# Patient Record
Sex: FEMALE | Race: WHITE
[De-identification: names, ages, dates, MRNs, and addresses within clinical notes are randomized per-mention and may not be internally consistent; named-entity substitution may affect disease eponyms.]

---

## 2017-02-09 ENCOUNTER — HOSPITAL ENCOUNTER (OUTPATIENT)
Dept: HOSPITAL 80 - FIMAGING | Age: 62
End: 2017-02-09
Attending: NURSE PRACTITIONER
Payer: MEDICAID

## 2017-02-09 DIAGNOSIS — N64.59: Primary | ICD-10-CM

## 2017-02-09 DIAGNOSIS — R92.8: ICD-10-CM

## 2017-02-09 PROCEDURE — G0279 TOMOSYNTHESIS, MAMMO: HCPCS

## 2017-02-09 PROCEDURE — G0204 DX MAMMO INCL CAD BI: HCPCS

## 2017-02-09 NOTE — MA
Bilateral Diagnostic Digital Mammogram With Tomosynthesis



Clinical Indications: History of recent left breast retroareolar redness and swelling, now resolved



Technique:  Standard digital cephalocaudal and tomosynthesis mediolateral oblique projections were ob
tained. A true lateral digital view is performed of the left breast. The digital images were processe
d by the BokeeD computer aided detection system.



Comparison: None available.



Breast density: D; The breast tissue is extremely dense. This may lower the sensitivity of mammograph
y.



Findings: CAD was reviewed. There is extremely dense asymmetric breast parenchyma behind the left nip
ple which could obscure an underlying abnormality.  No suspicious findings are identified.



Impression: Negative mammogram. BI-RADS 1.



Recommendation:  We will proceed to sonography for further evaluation. This will occur shortly.



BI-RADS 0. Additional imaging left breast



[facility] will send a result letter to the patient.



Negative mammography should not preclude additional workup of a clinically suspicious finding.



The patient's information is entered into a reminder system with a target due date for her next mammo
gram.

## 2017-02-09 NOTE — US
Left Breast Ultrasound



History: Recent redness and pain behind lateral left areola, resolved



Comparison: Diagnostic mammogram earlier



Technique: I first performed a directed physical examination.   This was followed by ultrasound exam 
with a high frequency linear transducer.



Findings: On physical examination, with the patient in the right lateral decubitus position, there is
 an easily palpable small firm nodule in or directly beneath the lateral left areola. Directed sonogr
aphy of this nodule reveals a 4 x 2.5 mm hypoechoic solid nodule, associated with a focal overlying l
ocal skin bulge that likely represents a sebaceous cyst. There is no increased Doppler blood flow. Th
is is occult, even in retrospect, on the previous mammogram.



Impression: Suspect small areolar sebaceous cyst. Recommend 6 month follow-up ultrasound to ensure st
ability. Results and recommendation were discussed with the patient, who is in agreement with the isabella
n. I demonstrated to the patient how she could feel this nodule on her own self exam. I instructed he
r to return for follow-up ultrasound if the nodule increases in size.



There is 3. Probably benign.

## 2017-02-27 LAB
ANION GAP SERPL CALC-SCNC: 10 MEQ/L (ref 8–16)
CALCIUM SERPL-MCNC: 10.3 MG/DL (ref 8.5–10.4)
CHLORIDE SERPL-SCNC: 105 MEQ/L (ref 97–110)
CO2 SERPL-SCNC: 25 MEQ/L (ref 22–31)
CREAT SERPL-MCNC: 0.8 MG/DL (ref 0.6–1)
GFR SERPL CREATININE-BSD FRML MDRD: > 60 ML/MIN/{1.73_M2}
GLUCOSE SERPL-MCNC: 82 MG/DL (ref 70–100)
POTASSIUM SERPL-SCNC: 4.7 MEQ/L (ref 3.5–5.2)
SODIUM SERPL-SCNC: 140 MEQ/L (ref 134–144)

## 2017-03-14 ENCOUNTER — HOSPITAL ENCOUNTER (OUTPATIENT)
Dept: HOSPITAL 80 - FSGY | Age: 62
Discharge: HOME | End: 2017-03-14
Attending: INTERNAL MEDICINE
Payer: MEDICAID

## 2017-03-14 DIAGNOSIS — K92.1: Primary | ICD-10-CM

## 2017-03-14 PROCEDURE — 0DJD8ZZ INSPECTION OF LOWER INTESTINAL TRACT, VIA NATURAL OR ARTIFICIAL OPENING ENDOSCOPIC: ICD-10-PCS | Performed by: INTERNAL MEDICINE

## 2017-03-14 NOTE — GPN
[f 
rep st]



                                                                 PROCEDURE NOTE





PREPROCEDURE DIAGNOSIS:  Hematochezia, blood in her stool.



POSTPROCEDURE DIAGNOSIS:  Normal colonoscopy.  Poor colon preparation.



MEDICATIONS:  Monitored anesthesia care.



INDICATIONS:  The patient is a 61-year-old female with intermittent 
hematochezia.  She has never had a screening colonoscopy.  She is here for a 
diagnostic colonoscopy.  The risks and the benefits of the procedure were 
discussed with the patient and consent obtained.  Risks include, but not 
limited to, bleeding, perforation, and sedation.  The patient is ASA class 2.



DESCRIPTION OF PROCEDURE:  The pediatric colonoscope was advanced into the cecum
, which appeared normal.  There was stool found throughout the colon.  
Approximately 30% of the colon wall could not be seen.  No large polyps or 
colon cancer were seen.  Polyps less than 1 cm could have been missed.  The 
patient has a floppy colon with looping. Retroflexed views in the rectum were 
normal. 



IMPRESSION:  

1.  Poor colonoscopy preparation, not adequate for screening purposes.

2.  No findings to explain blood in the stool.



RECOMMENDATIONS:  

1.  Discharge to home with escort.

2.  Advance diet as tolerated.

3.  Continue current medications.

4.  Repeat colonoscopy at next available appointment with GoLYTELY preparation 
with the addition of 1 dose of magnesium citrate.  Recommend using the adult 
colonoscope for next colonoscopy given the floppy nature of her colon. 



I thank you for allowing me to participate in the care of your patient.  Please 
do not hesitate to call with questions.





Job #:  [id number]

MTDD

## 2018-02-27 ENCOUNTER — HOSPITAL ENCOUNTER (OUTPATIENT)
Dept: HOSPITAL 80 - FIMAGING | Age: 63
End: 2018-02-27
Attending: FAMILY MEDICINE
Payer: MEDICAID

## 2018-02-27 DIAGNOSIS — R91.1: Primary | ICD-10-CM

## 2018-04-19 ENCOUNTER — HOSPITAL ENCOUNTER (OUTPATIENT)
Dept: HOSPITAL 80 - FIMAGING | Age: 63
End: 2018-04-19
Attending: INTERNAL MEDICINE
Payer: MEDICAID

## 2018-04-19 DIAGNOSIS — R93.8: ICD-10-CM

## 2018-04-19 DIAGNOSIS — R91.1: Primary | ICD-10-CM

## 2018-04-19 PROCEDURE — 07B53ZX EXCISION OF RIGHT AXILLARY LYMPHATIC, PERCUTANEOUS APPROACH, DIAGNOSTIC: ICD-10-PCS | Performed by: RADIOLOGY

## 2018-04-19 PROCEDURE — BP48ZZZ ULTRASONOGRAPHY OF RIGHT SHOULDER: ICD-10-PCS | Performed by: RADIOLOGY

## 2018-05-08 NOTE — GHP
[f rep st]



                                                            HISTORY AND PHYSICAL





DATE OF ADMISSION:  05/09/2018



HISTORY OF PRESENT ILLNESS:  The patient is a 62-year-old woman with a history of chronic tobacco use
, who recently had a PET-CT to evaluate an increasing right upper lobe lung nodule.  It showed increa
se in the nodule from 9 mm in 2006 to 16 mm.  It has low FDG uptake.  There is also a right axillary 
lymph node that was more PET avid.  Biopsy was obtained that did not show adenocarcinoma. 



She quit smoking a couple months ago.



PAST MEDICAL HISTORY:  COPD, depression, hyperlipidemia.



PAST SURGICAL HISTORY:  Appendectomy, cervical spine fusion, eye surgery.



MEDICATIONS:  Meloxicam.



ALLERGIES:  No known drug allergies.



FAMILY HISTORY:  Prostate cancer.



SOCIAL HISTORY:  She recently quit tobacco use.



REVIEW OF SYSTEMS:  10-point review of systems negative, except per HPI.



PHYSICAL EXAMINATION:  GENERAL:  Pleasant, well-nourished, well-groomed woman.  HEENT:  Normocephalic
.  No gross hearing deficits.  Mucous membranes moist.  Pupils equal and round.  No scleral icterus. 
 LUNGS:  Clear to auscultation bilaterally.  No increased work of breathing.  CARDIAC:  Regular rate.
  No peripheral edema.  SKIN:  Warm and dry.  MUSCULOSKELETAL:  Normal gait.  Normal nails.  PSYCH:  
Mood appropriately tearful.



IMPRESSION:  A 62-year-old woman with a lung nodule increasing in size and strong tobacco history.  I
t is also PET avid.  This likely represents cancer.



PLAN:  I will take her to the operating room for mediastinoscopy.  If that is negative, then I will p
roceed with VATS right upper lobectomy. 



The risks and benefits including, but not limited to, stroke, heart attack, death, bleeding, infectio
n, prolonged air leak, and oxygen dependence, were discussed.





Job #:  [id number]

## 2018-05-09 ENCOUNTER — HOSPITAL ENCOUNTER (INPATIENT)
Dept: HOSPITAL 80 - F3E | Age: 63
LOS: 3 days | Discharge: HOME | DRG: 121 | End: 2018-05-12
Attending: SURGERY | Admitting: SURGERY
Payer: MEDICAID

## 2018-05-09 DIAGNOSIS — F17.210: ICD-10-CM

## 2018-05-09 DIAGNOSIS — Z91.81: ICD-10-CM

## 2018-05-09 DIAGNOSIS — Z98.1: ICD-10-CM

## 2018-05-09 DIAGNOSIS — J44.9: ICD-10-CM

## 2018-05-09 DIAGNOSIS — Z96.641: ICD-10-CM

## 2018-05-09 DIAGNOSIS — G43.909: ICD-10-CM

## 2018-05-09 DIAGNOSIS — Z86.73: ICD-10-CM

## 2018-05-09 DIAGNOSIS — E78.5: ICD-10-CM

## 2018-05-09 DIAGNOSIS — F32.9: ICD-10-CM

## 2018-05-09 DIAGNOSIS — D14.31: Primary | ICD-10-CM

## 2018-05-09 DIAGNOSIS — D72.9: ICD-10-CM

## 2018-05-09 PROCEDURE — 0BJQ4ZZ INSPECTION OF PLEURA, PERCUTANEOUS ENDOSCOPIC APPROACH: ICD-10-PCS | Performed by: SURGERY

## 2018-05-09 PROCEDURE — 07B74ZX EXCISION OF THORAX LYMPHATIC, PERCUTANEOUS ENDOSCOPIC APPROACH, DIAGNOSTIC: ICD-10-PCS | Performed by: SURGERY

## 2018-05-09 PROCEDURE — 0BBC4ZX EXCISION OF RIGHT UPPER LUNG LOBE, PERCUTANEOUS ENDOSCOPIC APPROACH, DIAGNOSTIC: ICD-10-PCS | Performed by: SURGERY

## 2018-05-09 RX ADMIN — ACETAMINOPHEN SCH MG: 500 TABLET ORAL at 21:21

## 2018-05-09 RX ADMIN — DOCUSATE SODIUM AND SENNOSIDES SCH: 50; 8.6 TABLET ORAL at 18:01

## 2018-05-09 RX ADMIN — OXYCODONE HYDROCHLORIDE PRN MG: 15 TABLET ORAL at 21:22

## 2018-05-09 RX ADMIN — KETOROLAC TROMETHAMINE SCH MG: 15 INJECTION, SOLUTION INTRAMUSCULAR; INTRAVENOUS at 17:56

## 2018-05-09 RX ADMIN — HYDROMORPHONE HYDROCHLORIDE PRN MG: 2 INJECTION INTRAMUSCULAR; INTRAVENOUS; SUBCUTANEOUS at 16:28

## 2018-05-09 RX ADMIN — Medication PRN MCG: at 16:27

## 2018-05-09 RX ADMIN — DOCUSATE SODIUM AND SENNOSIDES SCH TAB: 50; 8.6 TABLET ORAL at 21:21

## 2018-05-09 RX ADMIN — Medication PRN MCG: at 16:19

## 2018-05-09 RX ADMIN — OXYCODONE HYDROCHLORIDE PRN MG: 15 TABLET ORAL at 17:57

## 2018-05-09 RX ADMIN — HYDROMORPHONE HYDROCHLORIDE PRN MG: 2 INJECTION INTRAMUSCULAR; INTRAVENOUS; SUBCUTANEOUS at 16:12

## 2018-05-09 NOTE — PDANEPAE
ANE History of Present Illness





mediastinoscopy, vats





ANE Past Medical History





- Cardiovascular History


Hx Hypertension: No


Hx Arrhythmias: No


Hx Chest Pain: No


Hx Coronary Artery / Peripheral Vascular Disease: No


Hx CHF / Valvular Disease: Yes


Hx Palpitations: No


Cardiovascular History Comment: diagnosed with mitral valve prolapse at 

[facility] 15 years.  bradycardic.  high cholesterol





- Pulmonary History


Hx COPD: No


Hx Asthma/Reactive Airway Disease: No


Hx Recent Upper Respiratory Infection: No


Hx Oxygen in Use at Home: No


Hx Sleep Apnea: No


Sleep Apnea Screening Result - Last Documented: Negative


Pulmonary History Comment: MASS UPPER R LOBE LUNG. QUIT SMOKING RECENTLY





- Neurologic History


Hx Cerebrovascular Accident: No


Hx Seizures: No


Hx Dementia: No


Neurologic History Comment: MIGRAINES





- Endocrine History


Hx Diabetes: No


Endocrine History Comment: prediabetic 2017





- Renal History


Hx Renal Disorders: No





- Liver History


Hx Hepatic Disorders: No





- Neurological & Psychiatric Hx


Hx Neurological and Psychiatric Disorders: Yes


Neurological / Psychiatric History Comment: anxiety. OCC NECK PAIN AND LOW BACK 

PAIN-ARTHRITIS





- Cancer History


Hx Cancer: Yes


Cancer History Comment: STAGE 1 LUNG CA





- Congenital Disorder History


Hx Congenital Disorders: No





- GI History


Hx Gastrointestinal Disorders: No


Gastrointestinal History Comment: OCC HEARTBURN





- Other Health History


Other Health History: BURSITIS L HIP.  missing molars





- Chronic Pain History


Chronic Pain: No





- Surgical History


Prior Surgeries: R total hip 5-17.  appy.  neck fusion C4-C7 2008.  tubal 

ligation.  bilat cataract surgeries





ANE Review of Systems


Review of Systems: 








- Exercise capacity


METS (RN): 4 METS





ANE Patient History





- Allergies


Allergies/Adverse Reactions: 








No Known Allergies Allergy (Verified 05/09/18 08:29)


 








- Home Medications


Home Medications: 








Acetaminophen [Tylenol  mg (*)] 500 mg PO Q6 PRN 05/04/18 [Last Taken 05/ 08/18 09:00]


LORazepam [Ativan (*)] 0.5 mg PO BID 05/08/18 [Last Taken 05/09/18 06:00]








- NPO status


NPO Status: no food or drink >8 hours


NPO Since - Liquids (Date): 05/09/18


NPO Since - Liquids (Time): 06:00


NPO Since - Solids (Date): 05/08/18


NPO Since - Solids (Time): 20:00





- Smoking Hx


Smoking Status: Former smoker





- Family Anes Hx


Family Hx Anesthesia Complications: mother-emotional





ANE Labs/Vital Signs





- Vital Signs


Blood Pressure: 109/65


Heart Rate: 51


Respiratory Rate: 16


O2 Sat (%): 98


Height: 175.26 cm


Weight: 63.957 kg





ANE Physical Exam





- Airway


Mallampati Score: Class 2


Mouth exam: normal dental/mouth exam





- Pulmonary


Pulmonary: no respiratory distress





- Cardiovascular


Cardiovascular: regular rate and rhythym





- ASA Status


ASA Status: II





ANE Anesthesia Plan


Anesthesia Plan: general endotracheal anesthesia


Lines/Monitors: arterial line


Specialized Airway: double lumen tube

## 2018-05-09 NOTE — POSTOPPROG
Post Op Note


Date of Operation: 05/09/18


Surgeon: Terrie Monroe


Assistant: oscar


Anesthesiologist: umm


Anesthesia: GET(General Endotracheal)


Pre-op Diagnosis: lung mass


Post-op Diagnosis: lung mass


Indication: 61 yo smoker with pet avid lung mass


Procedure: mediastinoscopy and vats RUL


Findings: small air leak


Inf/Abcess present in the surg proc area at time of surgery?: No


EBL: 100-500


Drains: Other (chest tube)


Specimen(s): 





lymph nodes and right upper lobe

## 2018-05-10 LAB — PLATELET # BLD: 252 10^3/UL (ref 150–400)

## 2018-05-10 RX ADMIN — KETOROLAC TROMETHAMINE SCH MG: 15 INJECTION, SOLUTION INTRAMUSCULAR; INTRAVENOUS at 06:11

## 2018-05-10 RX ADMIN — ACETAMINOPHEN SCH MG: 500 TABLET ORAL at 06:11

## 2018-05-10 RX ADMIN — KETOROLAC TROMETHAMINE SCH MG: 15 INJECTION, SOLUTION INTRAMUSCULAR; INTRAVENOUS at 17:50

## 2018-05-10 RX ADMIN — ACETAMINOPHEN SCH MG: 500 TABLET ORAL at 21:20

## 2018-05-10 RX ADMIN — MENTHOL SCH PATCH: 1 SPRAY TOPICAL at 08:59

## 2018-05-10 RX ADMIN — KETOROLAC TROMETHAMINE SCH MG: 15 INJECTION, SOLUTION INTRAMUSCULAR; INTRAVENOUS at 00:04

## 2018-05-10 RX ADMIN — ENOXAPARIN SODIUM SCH MG: 100 INJECTION SUBCUTANEOUS at 08:58

## 2018-05-10 RX ADMIN — KETOROLAC TROMETHAMINE SCH MG: 15 INJECTION, SOLUTION INTRAMUSCULAR; INTRAVENOUS at 11:41

## 2018-05-10 RX ADMIN — ACETAMINOPHEN SCH MG: 500 TABLET ORAL at 13:09

## 2018-05-10 RX ADMIN — DOCUSATE SODIUM AND SENNOSIDES SCH TAB: 50; 8.6 TABLET ORAL at 21:18

## 2018-05-10 RX ADMIN — DOCUSATE SODIUM AND SENNOSIDES SCH TAB: 50; 8.6 TABLET ORAL at 08:57

## 2018-05-10 RX ADMIN — OXYCODONE HYDROCHLORIDE PRN MG: 15 TABLET ORAL at 04:31

## 2018-05-10 RX ADMIN — OXYCODONE HYDROCHLORIDE PRN MG: 15 TABLET ORAL at 08:16

## 2018-05-10 NOTE — ASMTCASEMG
Living Arrangements

 

What is your living           Answers:  Alone                                 

arrangement? Who do you                                                       

live with?                                                                    

Type Of Residence

 

What kind of residence do     Answers:  Apartment                             

you live in?                                                                  

Discharge Plan Comments

 

Coordination Status           

Comments                      

Notes:

Patient is a 63yo  woman who recently had a PET-CT showing an increase from 9mm to 16mm in 

a right upper lobe lung nodule. Patient will get surgery. No therapies have been ordered at this 

time. D/C plan TBD. CM will follow.

 

Date Signed:  05/10/2018 11:20 AM

Electronically Signed By:Xin Harley LCSW

## 2018-05-10 NOTE — SOAPPROG
SOAP Progress Note


Assessment/Plan: 


Assessment:








POD # 1 s/p mediastinoscopy and right upper lobectomy for presumed lung cancer


Pain controlled with pain meds





Cough deep breath


Chest tube to water seal (no pneumo on cxr)


[facility]


transfer to floor





S: Pain controlled





O: Incisions cdi


Good effort breathing


No airleak


Regular rate

















Plan:





05/10/18 18:30





Objective: 





 Vital Signs











Temp Pulse Resp BP Pulse Ox


 


 36.9 C   58 L  16   91/45 L  90 L


 


 05/10/18 16:00  05/10/18 16:00  05/10/18 16:00  05/10/18 16:00  05/10/18 16:00








 Laboratory Results





 05/10/18 04:41 





 05/10/18 04:41 





 











 05/09/18 05/10/18 05/11/18





 05:59 05:59 05:59


 


Intake Total  3000 500


 


Output Total  1220 500


 


Balance  1780 0














ICD10 Worksheet


Patient Problems: 


 Problems











Problem Status Onset


 


Lung mass Acute  














- ICD10 Problem Qualifiers


(1) Lung mass

## 2018-05-10 NOTE — PDMN
Medical Necessity


Medical necessity: IP only surgery; cpt 76219 & [id number] w vats 

RUL lobectomy

## 2018-05-11 RX ADMIN — KETOROLAC TROMETHAMINE SCH MG: 15 INJECTION, SOLUTION INTRAMUSCULAR; INTRAVENOUS at 18:31

## 2018-05-11 RX ADMIN — ENOXAPARIN SODIUM SCH MG: 100 INJECTION SUBCUTANEOUS at 09:19

## 2018-05-11 RX ADMIN — KETOROLAC TROMETHAMINE SCH MG: 15 INJECTION, SOLUTION INTRAMUSCULAR; INTRAVENOUS at 00:09

## 2018-05-11 RX ADMIN — KETOROLAC TROMETHAMINE SCH MG: 15 INJECTION, SOLUTION INTRAMUSCULAR; INTRAVENOUS at 05:41

## 2018-05-11 RX ADMIN — ACETAMINOPHEN SCH MG: 500 TABLET ORAL at 14:11

## 2018-05-11 RX ADMIN — DOCUSATE SODIUM AND SENNOSIDES SCH TAB: 50; 8.6 TABLET ORAL at 09:21

## 2018-05-11 RX ADMIN — KETOROLAC TROMETHAMINE SCH MG: 15 INJECTION, SOLUTION INTRAMUSCULAR; INTRAVENOUS at 11:54

## 2018-05-11 RX ADMIN — DOCUSATE SODIUM AND SENNOSIDES SCH TAB: 50; 8.6 TABLET ORAL at 20:59

## 2018-05-11 RX ADMIN — MENTHOL SCH PATCH: 1 SPRAY TOPICAL at 09:19

## 2018-05-11 RX ADMIN — ACETAMINOPHEN SCH MG: 500 TABLET ORAL at 05:30

## 2018-05-11 NOTE — SOAPPROG
SOAP Progress Note


Assessment/Plan: 


Assessment/Plan: 61yo F POD#2 s/p VATS right upper lobectomy for lung cancer


Path pending


Chest tube to water seal


Min air leak with cough


CXR no pneumo


Keep chest tube today and will poss remove tomorrow if air leak improved


Regular diet, bowel protocol


Cough, deep breath, IS, ambulate


Dispo: continue inpatient. Seen c Dr. Monroe.





S: coughing all morning. Pain controlled. No fevers. 


O: Sitting upright in bed, comfortable, NAD


No increased WOB. Clear, decreased right upper chest


RRR


Chest incisions CDI


Chest tube serosanguinous fluid. Small air leak with cough


Objective: 





 Vital Signs











Temp Pulse Resp BP Pulse Ox


 


 37.0 C   65   16   117/61   93 


 


 05/11/18 07:40  05/11/18 07:40  05/11/18 07:40  05/11/18 07:40  05/11/18 07:40








 Laboratory Results





 05/10/18 04:41 





 05/10/18 04:41 





 











 05/10/18 05/11/18 05/12/18





 05:59 05:59 05:59


 


Intake Total 3000 950 


 


Output Total 1220 750 60


 


Balance 1780 200 -60














ICD10 Worksheet


Patient Problems: 


 Problems











Problem Status Onset


 


Lung mass Acute

## 2018-05-12 VITALS — DIASTOLIC BLOOD PRESSURE: 72 MMHG | SYSTOLIC BLOOD PRESSURE: 117 MMHG

## 2018-05-12 RX ADMIN — KETOROLAC TROMETHAMINE SCH MG: 15 INJECTION, SOLUTION INTRAMUSCULAR; INTRAVENOUS at 12:22

## 2018-05-12 RX ADMIN — KETOROLAC TROMETHAMINE SCH MG: 15 INJECTION, SOLUTION INTRAMUSCULAR; INTRAVENOUS at 05:49

## 2018-05-12 RX ADMIN — DOCUSATE SODIUM AND SENNOSIDES SCH: 50; 8.6 TABLET ORAL at 09:05

## 2018-05-12 RX ADMIN — KETOROLAC TROMETHAMINE SCH MG: 15 INJECTION, SOLUTION INTRAMUSCULAR; INTRAVENOUS at 00:42

## 2018-05-12 RX ADMIN — MENTHOL SCH PATCH: 1 SPRAY TOPICAL at 09:04

## 2018-05-12 RX ADMIN — ENOXAPARIN SODIUM SCH MG: 100 INJECTION SUBCUTANEOUS at 09:04

## 2018-05-12 NOTE — ASMTLACE
LACE

 

Length of stay for            Answers:  3 days                                

current admission                                                             

Acuity / Level of             Answers:  Yes                                   

Care: Did the patient                                                         

have an inpatient                                                             

admission?                                                                    

Comorbidities - select        Answers:  Other                         Notes:  increasing right upper 


all that apply                                                                lobe nodule

# of Emergency department     Answers:  0                                     

visits in the last 6                                                          

months                                                                        

Score: 7

 

Date Signed:  05/12/2018 01:41 PM

Electronically Signed By:Ericka Simms RN

## 2018-05-12 NOTE — ASMTCMCOM
CM Note

 

CM Note                       

Notes:

Spoke w/RN, pt will dc independent, no therapies ordered. CM available for any changes.



DC Plan: Independent.

 

Date Signed:  05/12/2018 01:39 PM

Electronically Signed By:Ericka Simms RN

## 2018-05-12 NOTE — SOAPPROG
SOAP Progress Note


Assessment/Plan: 


Assessment:








POD # 3 s/p mediastinoscopy and right upper lobectomy for hamartoma


Pain controlled with pain meds





Cough deep breath


Removed chest tube.


If no pneumothorax, then dc home this afternoon.  





S: Pain controlled





O: Incisions cdi


Good effort breathing


No airleak


Regular rate

















Plan:





05/10/18 18:30





05/12/18 09:48





Objective: 





 Vital Signs











Temp Pulse Resp BP Pulse Ox


 


 37.1 C   56 L  16   116/65   91 L


 


 05/12/18 08:00  05/12/18 08:00  05/12/18 08:00  05/12/18 08:00  05/12/18 08:00








 Laboratory Results





 05/10/18 04:41 





 05/10/18 04:41 





 











 05/11/18 05/12/18 05/13/18





 05:59 05:59 05:59


 


Intake Total 950  


 


Output Total 750 160 


 


Balance 200 -160 














ICD10 Worksheet


Patient Problems: 


 Problems











Problem Status Onset


 


Lung mass Acute  














- ICD10 Problem Qualifiers


(1) Lung mass

## 2018-05-31 NOTE — GDS
[f rep st]



                                                             DISCHARGE SUMMARY





DISCHARGE DIAGNOSES:  

1.  Enlarging right upper lobe lung mass, status post mediastinoscopy and right upper lobectomy, iden
tified as a hamartoma. 

2.  History of recent tobacco use with cessation within 2 months of admission.

3.  Chronic obstructive pulmonary disease.

4.  Depression.

5.  Dyslipidemia.

6.  Previous appendectomy, cervical spine surgery, eye surgery.



PROCEDURES:  

1.  05/09/2018, mediastinoscopy and VATS right upper lobectomy.

2.  05/09/2018, chest tube placement.

3.  Serial chest x-rays through the admission.

4.  05/12/2018, chest tube removal.



PHYSICIAN:  Dr. Terrie Monroe.



HOSPITAL COURSE:  Please see dictated H and P by Dr. Terrie Monroe for complete details.  In brief, the
 patient is a 62-year-old female who was noted with a history of chronic tobacco use with recent cess
ation, who had a PET-CT to evaluate an increasing right upper lobe nodule.  This showed an increase i
n the nodule from 9 mm in 2016 to 16 mm recently.  This had a low FDG uptake.  There was also a right
 axillary lymph node that was more PET avid.  Biopsy was obtained preoperatively and this did not umesh
w an adenocarcinoma.  



These findings were concerning for cancer, and therefore, she was taken to the operating room for med
iastinoscopy and subsequent VATS with right upper lobectomy.  This was complicated by an air leak and
 a chest tube was placed.  Pathology showed a peritracheal lymph node biopsy without any metastatic c
arcinoma.  Right paratracheal lymph node had mild sinus histiocytosis and no metastatic carcinoma.  T
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
broelastic pleural plaque and emphysematous changes and respiratory bronchiolitis.  



On day of discharge, patient's chest tube was removed.  No pneumothorax was found on subsequent chest
 x-ray and patient was discharged.



PHYSICAL EXAMINATION:  VITAL SIGNS:  On day of discharge, blood pressure 117/77, heart rate of 58, re
spirations 14, O2 saturation 93% on room air, temp of 98.4 degrees Fahrenheit.  CHEST:  Incision was 
clean, dry, and intact.  There is good effort breathing.  No air leak.  HEART:  Regular rate and rhyt
hm.



LABORATORY DATA:  CBC with WBC of 12.05, hematocrit 37.3, hemoglobin 12.9, platelet count 252.  BMP w
ith sodium 138, potassium 4.9, chloride 105, CO2 25, BUN 13, creatinine 0.7, glucose 121.



RESULTS PENDING:  None.



DIET:  Per previous.



ACTIVITY:  No heavy lifting, pushing, or pulling greater than 15 pounds with right arm for 4 weeks.  
Try to increase cough and deep breath.



DISCHARGE MEDICATIONS:  Please see medication reconciliation.  She may take ibuprofen, Senokot, and t
ramadol.  She can continue her acetaminophen and lorazepam.



FOLLOW UP:  Follow up with Dr. Terrie Monroe in 10 days.





Job #:  [id number]

## 2018-05-31 NOTE — GOP
[f rep st]



                                                                OPERATIVE REPORT





DATE OF OPERATION:  05/12/2018



SURGEON:  Terrie Monroe MD



ASSISTANT:  Chris Draper MD for the lobectomy.



ANESTHESIA:  Raymond Kim MD/General.



PREOPERATIVE DIAGNOSIS:  Right upper lobe lung mass that is PET avid.



POSTOPERATIVE DIAGNOSIS:  Right upper lobe lung mass that is PET avid.



PROCEDURE PERFORMED:  Mediastinoscopy and video-assisted thoracoscopic surgery, right upper lobectomy
.



FINDINGS:  Small air leak.



SPECIMENS:  Lymph nodes from mediastinum and right upper lobectomy.



INDICATIONS:  The patient is a 62-year-old woman who has a long history of smoking.  She also has a P
ET avid lung mass.   It is in the central portion of the right upper lobe and biopsy was not feasible
.



DESCRIPTION OF PROCEDURE:  Patient was brought into the operating room, placed supine on the table an
d general anesthesia was administered.  Her neck was prepped and draped in the usual sterile fashion.
  It was extended.  I made an incision at the sternal notch.  I dissected down through the skin and s
ubcutaneous tissues.  I divided the strap muscles.  I continued my dissection until I encountered the
 trachea.  I created a space in this area.  I inserted the mediastinum scope.  I performed dissection
.  I was at the level of the innominate.  I then was able to obtain lymph node samples and send them 
to Pathology.  They returned negative.  Hemostasis was achieved.  I closed the straps with 0 Vicryl. 
 I closed skin with 3-0 Vicryl followed by 4-0 Monocryl, Mastisol, Steri-Strips and a dressing were a
pplied.  She was then placed in the decubitus position with the right side up.  She was prepped and d
raped in the usual sterile fashion.  Dr. Draper then entered the operating room.  I placed a trocar in
 the posterior axillary line.  The lung was down.  I then placed additional posterior trocar, anterio
r trocar and an additional lower trocar.  I identified the venous drainage.  I dissected the pulmonar
y vein free by passing a blunt dissector around the vein.  I then was able to place a vascular endosc
opic stapling device to divide this.  I continued to perform dissection and I was able to dissect adj
acent pulmonary arteries from the surrounding tissues.  I divided the branches again with the staplin
g device.  At one point, there was a small amount of bleeding, but I was able to control this with a 
clip.  I was careful to identify the right upper lobe artery and make sure that I was not dissecting 
the main pulmonary artery.  I was able to identify the intermedius artery as the blood supply to the 
middle and lower lobe before dividing the blood supply to the upper lobe.  I flipped the lung and was
 able to observe anteriorly and performed additional dissection again taking another vein and artery.
  I also used a blue load stapler to transect the fissure.  Once I had the lung remaining by the bron
chus, I then clamped it and had the anesthesiologist reinsufflate the lung.  The middle lobe and lowe
r lobe did come up.  I then transected the upper lobe.  I placed the lung in an EndoCatch bag, enlarg
ed a port and then removed the lung.  I then had him reinsufflate the remaining lung.  There was a ve
ry small air leak.  I also noted that there was a small portion of the parenchyma of the upper lobe s
till attached to the middle lobe.  I was able to grasp this and perform additional staple loads to re
move this area by an incomplete fissure.  The small segment was also removed.  I then re-immersed the
 lung and had several large breaths.  I could identify a small air leak.  I put a sealant device over
 the lung.  The air leak stopped.  I placed a 28-Bur[place] chest tube directed toward the apex and I all
owed anesthesia to re-expand the lung.  The larger extraction incision was closed with 3-0 Vicryl fol
lowed by 3-0 Vicryl and 4-0 Monocryl.  The chest tube was sutured in place with 0 Vicryl.  The other 
2 port sites were closed with 4 Monocryl, Dermabond applied.  She was awakened in the operating room,
 extubated and transferred to PACU in stable condition.  Chest x-ray is pending.





Job #:  [id number]

## 2018-12-11 ENCOUNTER — HOSPITAL ENCOUNTER (OUTPATIENT)
Dept: HOSPITAL 80 - FIMAGING | Age: 63
End: 2018-12-11
Attending: INTERNAL MEDICINE
Payer: MEDICAID

## 2018-12-11 DIAGNOSIS — R13.10: Primary | ICD-10-CM

## 2018-12-11 DIAGNOSIS — R06.00: ICD-10-CM

## 2018-12-11 DIAGNOSIS — Z90.2: ICD-10-CM

## 2018-12-11 DIAGNOSIS — J44.9: ICD-10-CM

## 2018-12-13 ENCOUNTER — HOSPITAL ENCOUNTER (OUTPATIENT)
Dept: HOSPITAL 80 - FIMAGING | Age: 63
End: 2018-12-13
Attending: INTERNAL MEDICINE
Payer: MEDICAID

## 2018-12-13 DIAGNOSIS — K21.9: Primary | ICD-10-CM

## 2018-12-13 DIAGNOSIS — K22.8: ICD-10-CM
